# Patient Record
Sex: FEMALE | Race: WHITE | Employment: PART TIME | URBAN - METROPOLITAN AREA
[De-identification: names, ages, dates, MRNs, and addresses within clinical notes are randomized per-mention and may not be internally consistent; named-entity substitution may affect disease eponyms.]

---

## 2023-04-26 ENCOUNTER — APPOINTMENT (OUTPATIENT)
Dept: CT IMAGING | Age: 18
DRG: 101 | End: 2023-04-26
Attending: EMERGENCY MEDICINE

## 2023-04-26 ENCOUNTER — HOSPITAL ENCOUNTER (INPATIENT)
Age: 18
LOS: 1 days | Discharge: HOME | DRG: 101 | End: 2023-04-27
Attending: EMERGENCY MEDICINE | Admitting: STUDENT IN AN ORGANIZED HEALTH CARE EDUCATION/TRAINING PROGRAM

## 2023-04-26 DIAGNOSIS — R56.9 SEIZURE (HCC): Primary | ICD-10-CM

## 2023-04-26 LAB
AMPHET UR QL SCN: NEGATIVE
ANION GAP SERPL CALC-SCNC: 9 MMOL/L (ref 5–15)
APPEARANCE UR: CLEAR
BACTERIA URNS QL MICRO: NEGATIVE /HPF
BARBITURATES UR QL SCN: NEGATIVE
BASOPHILS # BLD: 0 K/UL (ref 0–0.1)
BASOPHILS NFR BLD: 0 % (ref 0–1)
BENZODIAZ UR QL: NEGATIVE
BILIRUB UR QL: NEGATIVE
BUN SERPL-MCNC: 11 MG/DL (ref 6–20)
BUN/CREAT SERPL: 12 (ref 12–20)
CALCIUM SERPL-MCNC: 9.2 MG/DL (ref 8.5–10.1)
CANNABINOIDS UR QL SCN: POSITIVE
CHLORIDE SERPL-SCNC: 104 MMOL/L (ref 97–108)
CO2 SERPL-SCNC: 27 MMOL/L (ref 21–32)
COCAINE UR QL SCN: NEGATIVE
COLOR UR: ABNORMAL
CREAT SERPL-MCNC: 0.94 MG/DL (ref 0.55–1.02)
DIFFERENTIAL METHOD BLD: ABNORMAL
DRUG SCRN COMMENT,DRGCM: ABNORMAL
EOSINOPHIL # BLD: 0 K/UL (ref 0–0.4)
EOSINOPHIL NFR BLD: 0 % (ref 0–7)
EPITH CASTS URNS QL MICRO: NORMAL /LPF
ERYTHROCYTE [DISTWIDTH] IN BLOOD BY AUTOMATED COUNT: 12.1 % (ref 11.5–14.5)
ETHANOL SERPL-MCNC: <10 MG/DL
GLUCOSE SERPL-MCNC: 92 MG/DL (ref 65–100)
GLUCOSE UR STRIP.AUTO-MCNC: NEGATIVE MG/DL
HCG UR QL: NEGATIVE
HCT VFR BLD AUTO: 43.8 % (ref 35–47)
HGB BLD-MCNC: 15 G/DL (ref 11.5–16)
HGB UR QL STRIP: NEGATIVE
IMM GRANULOCYTES # BLD AUTO: 0.2 K/UL (ref 0–0.04)
IMM GRANULOCYTES NFR BLD AUTO: 2 % (ref 0–0.5)
KETONES UR QL STRIP.AUTO: ABNORMAL MG/DL
LEUKOCYTE ESTERASE UR QL STRIP.AUTO: ABNORMAL
LYMPHOCYTES # BLD: 1.3 K/UL (ref 0.8–3.5)
LYMPHOCYTES NFR BLD: 14 % (ref 12–49)
MCH RBC QN AUTO: 29.4 PG (ref 26–34)
MCHC RBC AUTO-ENTMCNC: 34.2 G/DL (ref 30–36.5)
MCV RBC AUTO: 85.7 FL (ref 80–99)
METHADONE UR QL: NEGATIVE
MONOCYTES # BLD: 0.6 K/UL (ref 0–1)
MONOCYTES NFR BLD: 6 % (ref 5–13)
NEUTS SEG # BLD: 7.4 K/UL (ref 1.8–8)
NEUTS SEG NFR BLD: 78 % (ref 32–75)
NITRITE UR QL STRIP.AUTO: NEGATIVE
NRBC # BLD: 0 K/UL (ref 0–0.01)
NRBC BLD-RTO: 0 PER 100 WBC
OPIATES UR QL: NEGATIVE
PCP UR QL: NEGATIVE
PH UR STRIP: 6.5 (ref 5–8)
PLATELET # BLD AUTO: 279 K/UL (ref 150–400)
PMV BLD AUTO: 9.1 FL (ref 8.9–12.9)
POTASSIUM SERPL-SCNC: 4.3 MMOL/L (ref 3.5–5.1)
PROT UR STRIP-MCNC: 30 MG/DL
RBC # BLD AUTO: 5.11 M/UL (ref 3.8–5.2)
RBC #/AREA URNS HPF: NORMAL /HPF (ref 0–5)
SODIUM SERPL-SCNC: 140 MMOL/L (ref 136–145)
SP GR UR REFRACTOMETRY: 1.02
UROBILINOGEN UR QL STRIP.AUTO: 1 EU/DL (ref 0.2–1)
WBC # BLD AUTO: 9.5 K/UL (ref 3.6–11)
WBC URNS QL MICRO: NORMAL /HPF (ref 0–4)

## 2023-04-26 PROCEDURE — 96375 TX/PRO/DX INJ NEW DRUG ADDON: CPT

## 2023-04-26 PROCEDURE — 80048 BASIC METABOLIC PNL TOTAL CA: CPT

## 2023-04-26 PROCEDURE — 85025 COMPLETE CBC W/AUTO DIFF WBC: CPT

## 2023-04-26 PROCEDURE — 82077 ASSAY SPEC XCP UR&BREATH IA: CPT

## 2023-04-26 PROCEDURE — 65270000029 HC RM PRIVATE

## 2023-04-26 PROCEDURE — 96374 THER/PROPH/DIAG INJ IV PUSH: CPT

## 2023-04-26 PROCEDURE — 81001 URINALYSIS AUTO W/SCOPE: CPT

## 2023-04-26 PROCEDURE — 36415 COLL VENOUS BLD VENIPUNCTURE: CPT

## 2023-04-26 PROCEDURE — 81025 URINE PREGNANCY TEST: CPT

## 2023-04-26 PROCEDURE — 70450 CT HEAD/BRAIN W/O DYE: CPT

## 2023-04-26 PROCEDURE — 80307 DRUG TEST PRSMV CHEM ANLYZR: CPT

## 2023-04-26 PROCEDURE — 99285 EMERGENCY DEPT VISIT HI MDM: CPT

## 2023-04-26 PROCEDURE — 96376 TX/PRO/DX INJ SAME DRUG ADON: CPT

## 2023-04-26 PROCEDURE — 74011250636 HC RX REV CODE- 250/636: Performed by: EMERGENCY MEDICINE

## 2023-04-26 RX ORDER — TIZANIDINE 2 MG/1
2 TABLET ORAL DAILY
COMMUNITY

## 2023-04-26 RX ORDER — LORAZEPAM 2 MG/ML
0.5 INJECTION INTRAMUSCULAR
Status: COMPLETED | OUTPATIENT
Start: 2023-04-26 | End: 2023-04-26

## 2023-04-26 RX ORDER — LEVETIRACETAM 15 MG/ML
1500 INJECTION INTRAVASCULAR EVERY 12 HOURS
Status: DISCONTINUED | OUTPATIENT
Start: 2023-04-26 | End: 2023-04-27 | Stop reason: SDUPTHER

## 2023-04-26 RX ORDER — ONDANSETRON 2 MG/ML
4 INJECTION INTRAMUSCULAR; INTRAVENOUS
Status: COMPLETED | OUTPATIENT
Start: 2023-04-26 | End: 2023-04-26

## 2023-04-26 RX ORDER — AMITRIPTYLINE HYDROCHLORIDE 75 MG/1
75 TABLET, FILM COATED ORAL
COMMUNITY

## 2023-04-26 RX ADMIN — LORAZEPAM 0.5 MG: 2 INJECTION INTRAMUSCULAR; INTRAVENOUS at 23:16

## 2023-04-26 RX ADMIN — SODIUM CHLORIDE 1000 ML: 9 INJECTION, SOLUTION INTRAVENOUS at 21:23

## 2023-04-26 RX ADMIN — ONDANSETRON 4 MG: 2 INJECTION INTRAMUSCULAR; INTRAVENOUS at 20:16

## 2023-04-26 RX ADMIN — LORAZEPAM 0.5 MG: 2 INJECTION INTRAMUSCULAR; INTRAVENOUS at 20:16

## 2023-04-26 RX ADMIN — LEVETIRACETAM 1500 MG: 15 INJECTION, SOLUTION INTRAVENOUS at 23:16

## 2023-04-26 NOTE — ED TRIAGE NOTES
Per EMS, pt was at [employer] and had witnessed seizure. Per EMS bystanders stated pt was foaming at the mouth. Pt states they were sitting when seizure occurred. Pt denies pain, reports nausea. Pt reports feeling tired prior to seizing. Pt states they have not eaten all day and previous seizure occurred when they did not eat  Pt was taking Wellbutrin, taken off Nov 2021. Pt had seizure Nov 2021, March 2022, and today.

## 2023-04-27 ENCOUNTER — APPOINTMENT (OUTPATIENT)
Dept: MRI IMAGING | Age: 18
DRG: 101 | End: 2023-04-27
Attending: STUDENT IN AN ORGANIZED HEALTH CARE EDUCATION/TRAINING PROGRAM

## 2023-04-27 VITALS
HEIGHT: 65 IN | TEMPERATURE: 98.4 F | RESPIRATION RATE: 18 BRPM | OXYGEN SATURATION: 98 % | WEIGHT: 173.9 LBS | DIASTOLIC BLOOD PRESSURE: 64 MMHG | SYSTOLIC BLOOD PRESSURE: 103 MMHG | HEART RATE: 113 BPM | BODY MASS INDEX: 28.97 KG/M2

## 2023-04-27 LAB
ATRIAL RATE: 103 BPM
CALCULATED P AXIS, ECG09: 29 DEGREES
CALCULATED R AXIS, ECG10: 54 DEGREES
CALCULATED T AXIS, ECG11: 53 DEGREES
DIAGNOSIS, 93000: NORMAL
MAGNESIUM SERPL-MCNC: 2.3 MG/DL (ref 1.6–2.4)
P-R INTERVAL, ECG05: 128 MS
PHOSPHATE SERPL-MCNC: 4 MG/DL (ref 2.6–4.7)
Q-T INTERVAL, ECG07: 340 MS
QRS DURATION, ECG06: 92 MS
QTC CALCULATION (BEZET), ECG08: 445 MS
TSH SERPL DL<=0.05 MIU/L-ACNC: 1.11 UIU/ML (ref 0.36–3.74)
VENTRICULAR RATE, ECG03: 103 BPM

## 2023-04-27 PROCEDURE — 84100 ASSAY OF PHOSPHORUS: CPT

## 2023-04-27 PROCEDURE — 83735 ASSAY OF MAGNESIUM: CPT

## 2023-04-27 PROCEDURE — 36415 COLL VENOUS BLD VENIPUNCTURE: CPT

## 2023-04-27 PROCEDURE — 77030013256 HC HEADBAND EEG - F

## 2023-04-27 PROCEDURE — 84443 ASSAY THYROID STIM HORMONE: CPT

## 2023-04-27 PROCEDURE — 70553 MRI BRAIN STEM W/O & W/DYE: CPT

## 2023-04-27 PROCEDURE — 74011250636 HC RX REV CODE- 250/636: Performed by: STUDENT IN AN ORGANIZED HEALTH CARE EDUCATION/TRAINING PROGRAM

## 2023-04-27 PROCEDURE — 93005 ELECTROCARDIOGRAM TRACING: CPT

## 2023-04-27 PROCEDURE — A9576 INJ PROHANCE MULTIPACK: HCPCS | Performed by: STUDENT IN AN ORGANIZED HEALTH CARE EDUCATION/TRAINING PROGRAM

## 2023-04-27 PROCEDURE — 74011250637 HC RX REV CODE- 250/637: Performed by: STUDENT IN AN ORGANIZED HEALTH CARE EDUCATION/TRAINING PROGRAM

## 2023-04-27 PROCEDURE — 74011000250 HC RX REV CODE- 250: Performed by: STUDENT IN AN ORGANIZED HEALTH CARE EDUCATION/TRAINING PROGRAM

## 2023-04-27 RX ORDER — SODIUM CHLORIDE 0.9 % (FLUSH) 0.9 %
5-40 SYRINGE (ML) INJECTION EVERY 8 HOURS
Status: DISCONTINUED | OUTPATIENT
Start: 2023-04-27 | End: 2023-04-27 | Stop reason: HOSPADM

## 2023-04-27 RX ORDER — ACETAMINOPHEN 325 MG/1
650 TABLET ORAL
Status: DISCONTINUED | OUTPATIENT
Start: 2023-04-27 | End: 2023-04-27 | Stop reason: HOSPADM

## 2023-04-27 RX ORDER — SODIUM CHLORIDE 9 MG/ML
125 INJECTION, SOLUTION INTRAVENOUS CONTINUOUS
Status: DISCONTINUED | OUTPATIENT
Start: 2023-04-27 | End: 2023-04-27

## 2023-04-27 RX ORDER — SODIUM CHLORIDE 0.9 % (FLUSH) 0.9 %
5-40 SYRINGE (ML) INJECTION AS NEEDED
Status: DISCONTINUED | OUTPATIENT
Start: 2023-04-27 | End: 2023-04-27 | Stop reason: HOSPADM

## 2023-04-27 RX ORDER — LEVETIRACETAM 750 MG/1
750 TABLET ORAL 2 TIMES DAILY
Qty: 60 TABLET | Refills: 1 | Status: SHIPPED | OUTPATIENT
Start: 2023-04-27

## 2023-04-27 RX ADMIN — AMITRIPTYLINE HYDROCHLORIDE 75 MG: 50 TABLET, FILM COATED ORAL at 01:55

## 2023-04-27 RX ADMIN — SODIUM CHLORIDE 500 ML: 9 INJECTION, SOLUTION INTRAVENOUS at 16:23

## 2023-04-27 RX ADMIN — GADOTERIDOL 17 ML: 279.3 INJECTION, SOLUTION INTRAVENOUS at 11:21

## 2023-04-27 RX ADMIN — SODIUM CHLORIDE, PRESERVATIVE FREE 10 ML: 5 INJECTION INTRAVENOUS at 16:23

## 2023-04-27 RX ADMIN — SODIUM CHLORIDE 125 ML/HR: 9 INJECTION, SOLUTION INTRAVENOUS at 06:56

## 2023-04-27 RX ADMIN — LEVETIRACETAM 750 MG: 500 TABLET, FILM COATED ORAL at 16:56

## 2023-04-27 RX ADMIN — SODIUM CHLORIDE 125 ML/HR: 9 INJECTION, SOLUTION INTRAVENOUS at 02:20

## 2023-04-27 RX ADMIN — SODIUM CHLORIDE, PRESERVATIVE FREE 10 ML: 5 INJECTION INTRAVENOUS at 06:51

## 2023-04-27 RX ADMIN — SODIUM CHLORIDE, PRESERVATIVE FREE 10 ML: 5 INJECTION INTRAVENOUS at 01:57

## 2023-04-27 RX ADMIN — LEVETIRACETAM 750 MG: 500 TABLET, FILM COATED ORAL at 09:06

## 2023-04-27 NOTE — ED NOTES
Emergency Department Nursing Plan of Care       The Nursing Plan of Care is developed from the Nursing assessment and Emergency Department Attending provider initial evaluation. The plan of care may be reviewed in the ED Provider note.     The Plan of Care was developed with the following considerations:   Patient / Family readiness to learn indicated by:verbalized understanding  Persons(s) to be included in education: patient  Barriers to Learning/Limitations:No    Signed     Avis Kebede RN    4/26/2023   8:52 PM

## 2023-04-27 NOTE — PROGRESS NOTES
TRANSFER - IN REPORT:    Verbal report received from Vijay Forrest on [employer]  being received from [facility] for routine progression of care      Report consisted of patients Situation, Background, Assessment and   Recommendations(SBAR). Information from the following report(s) SBAR, Kardex, Intake/Output, MAR, and Recent Results was reviewed with the receiving nurse. Opportunity for questions and clarification was provided. Assessment completed upon patients arrival to unit and care assumed.

## 2023-04-27 NOTE — PROGRESS NOTES
Please fill out screening sheet, nurse to sign and patient. When complete call [email or web address] to schedule a time for MRI.

## 2023-04-27 NOTE — PROGRESS NOTES
0720 -- Bedside shift change report given to Carmelina Starr LPN (oncoming nurse) by Britton Dai RN (offgoing nurse). Report included the following information SBAR, Kardex, Intake/Output, and MAR.       7221 -- Perfect Serve to Dr. Rolando Barron:  New admission overnight for seizures. Do you want to order an EEG?  4/27/23 7:28 AM  No    4/27/23 7:28 AM  Just MRI and then discharge    0841 -- MRI screening questions completed, pt signed. 5595 -- IV in LT AC removed, new IV started in right forearm, 20G.     1340 -- Pt's MEWS score 3,  Sinus Tachy. Dr. Rolando Barron informed. Will continue to monitor. 1640 -- Discharge paperwork reviewed with pt and pt given her copies. Also given copy of work/school note.

## 2023-04-27 NOTE — PROGRESS NOTES
0115) Patient arrived on the unit via wheelchair with two ED staff.     2136) Admission assessment done. Patient is alert and oriented x4. VS taken. Patient is calm and  cooperative. Has new peripheral IV 20 g at left [facility]. Line assessed, flushed and recapped. Dual skin assessment done with OBDULIO Hunt. Has tattoos, facial acnes. Seizure precaution applied. Has walking boot for sprained left ankle. Patient changed her clothing to paper scrub. Call light within reach. Bed alarm activated. 0155) Scheduled Amitriptyline 75 mg tablet given with 60 ml of water. No additional needs at this time. 0220) New bag of 0.9% sodium chloride 1000 ml solution hanged and infusing. 0715) Bedside and Verbal shift change report given to OBDULIO De Leon (oncoming nurse) by Maxi Miranda (offgoing nurse). Report included the following information SBAR, Kardex, Intake/Output, MAR and Recent Results.

## 2023-04-27 NOTE — PROGRESS NOTES
Problem: Falls - Risk of  Goal: *Absence of Falls  Description: Document Vibha Salines Fall Risk and appropriate interventions in the flowsheet.   Outcome: Progressing Towards Goal  Note: Fall Risk Interventions:                                Problem: Patient Education: Go to Patient Education Activity  Goal: Patient/Family Education  Outcome: Progressing Towards Goal     Problem: Seizure Disorder (Adult)  Goal: Interventions  Outcome: Progressing Towards Goal     Problem: Patient Education: Go to Patient Education Activity  Goal: Patient/Family Education  Outcome: Progressing Towards Goal

## 2023-04-27 NOTE — DISCHARGE INSTRUCTIONS
According to state law, patient cannot drive for 6 months after a seizure. Patient is to refrain from unsupervised swimming or bathing, climbing heights, or  handling heavy machinery.   - If experience worsening of mood or depression, please call neurology for another agent   -Follow-up with neurology as an outpatient further management of seizure disorder

## 2023-04-27 NOTE — ED NOTES
TRANSFER - OUT REPORT:    Verbal report given to Souleymane Platt RN(name) on [employer]  being transferred to 210(unit) for routine progression of care       Report consisted of patients Situation, Background, Assessment and   Recommendations(SBAR). Information from the following report(s) SBAR, Kardex, ED Summary, OR Summary, Procedure Summary, Intake/Output, MAR, Recent Results and Cardiac Rhythm ST was reviewed with the receiving nurse. Lines:   Peripheral IV 04/26/23 Left Antecubital (Active)   Site Assessment Clean, dry, & intact 04/26/23 1954   Phlebitis Assessment 0 04/26/23 1954   Infiltration Assessment 0 04/26/23 1954        Opportunity for questions and clarification was provided. Patient transported with:   Monitor  Registered Nurse     Pt will be transferred after CT scan.

## 2023-04-27 NOTE — PROCEDURES
RAPID ELECTROENCEPHALOGRAM REPORT  [facility]    Procedure ID: [id number] Procedure Date: 4/27/2023   Patient Name: Raysa Chavez YOB: 2005   Procedure Type: Ceribell Rapid EEG Medical Record No: [id number]     Study Duration: 1 hour 52 minutes  Recording Start Time: 1:52 PM  Recording End Time: 3:26 PM    History: Seizure    Medications: Not listed    Description of procedure: This EEG was obtained using a 10 lead, 8 channel system positioned circumferentially without any parasagittal coverage (rapid EEG). Computer selected EEG is reviewed as well as background features and all clinically significant events. Clarity algorithm utilized and implemented to provide analysis of underlying activity and seizure detection used to facilitate reading. Description of recording: The background consists of diffuse low voltage fast frequency beta wave activity. Sleep architecture is not seen. Impression: Normal rapid EEG. No epileptiform abnormalities are seen. No evidence for ongoing or subclinical ictus. Comment: A normal EEG does not rule out the diagnosis of a seizure disorder; clinical  correlation is advised. If there is still persistent suspicion for continued seizure-like  activity, would advise obtaining an electroencephalogram with the 10-20 international  system for improved spatial resolution and parasagittal coverage.     Olvin Ayala MD

## 2023-04-27 NOTE — PROGRESS NOTES
[address] [facility].       4/27/2023      RE: [facility]      To Whom it May Concern: This is to certify that Oralia Diehl was admitted to hospital on 4/26/2023   to  She may return to work/school on 5/02/2023. Thank you for your assistance in this matter.     Sincerely,      Asad Lacy MD

## 2023-04-27 NOTE — PROGRESS NOTES
..Headband: applied  Date/Time: 4/27/23 1332  Recorder: recording started  Skin: Intact    1423:Highest Seizure [place] Percentage past hour: 0      General info regarding Seizure [place] %:  Minimum duration of study is 2 hours. If Seizure [place] has remained 0% throughout the entire 2-hour duration, communicate with provider to stop the recording. Seizure [place] 0-10% - Continue to monitor and complete 2-hour study. Seizure [place] 11-89% - Epileptiform activity present. Notify provider for next steps. Seizure [place] >/= 90% - Epileptiform activity consistent with Status Epilepticus. Immediately notify provider. *Patients with Seizure [place] above 10% that persists may require a study longer than 2 hours. Maximum recording duration is 24 hours. Please update provider with a persistent increase in Seizure [place] above 10%.

## 2023-04-27 NOTE — H&P
History & Physical    Primary Care Provider: Lou Gonzalez MD  Source of Information: Patient and chart review    History of Presenting Illness:   Lisa Abdalla is a 21 y.o. female with history of seizure disorder, anxiety, major depressive disorder, obesity who presented to [facility] for evaluation of seizures. Patient stated that had witnessed seizure while at [employer]. She admits to previous history of seizures with last seizure occurred in 2021 and did not secondary to Wellbutrin. She subsequently had seizure work-up to include EEG and noncontrast MRI which was unremarkable. She was seen in ED by teleneurology. Deep tendon; was loaded with Keppra and recommendation was for MRI brain with and without contrast prior to DC on 750 mg Keppra daily. MRI was unable to be obtained in [place] from ED at this time patient admitted for MRI in a.m. The patient denies any fever, chills, chest or abdominal pain, nausea, vomiting, cough, congestion, recent illness, palpitations, or dysuria. Remarkable vitals on ER Presentation: hr to 120  Labs Remarkable for: uds: +krystin thc  ER Images: ct head: no acute process  ER Rx: 1 L normal saline bolus, Zofran, Ativan 1 mg, Keppra 1500 mg     Review of Systems:  Pertinent items are noted in the History of Present Illness. Past Medical History:   Diagnosis Date    Anxiety     Concussion     Depression     Seizures ([facility] [address]     last seizure 4/26/23    Tachycardia       History reviewed. No pertinent surgical history. Prior to Admission medications    Medication Sig Start Date End Date Taking? Authorizing Provider   amitriptyline (ELAVIL) 75 mg tablet Take 1 Tablet by mouth nightly. Yes Allie, MD Maci   tiZANidine (ZANAFLEX) 2 mg tablet Take 1 Tablet by mouth daily. Yes Other, MD Maci     Allergies   Allergen Reactions    Peanuts [Peanut] Nausea Only      History reviewed. No pertinent family history. SOCIAL HISTORY:  Patient resides:  Independently x   Assisted Living    SNF    With family care       Smoking history:   None x   Former    Chronic      Alcohol history:   None x   Social    Chronic      Ambulates:   Independently x   w/cane    w/walker    w/wc    CODE STATUS:  DNR    Full x   Other      Objective:     Physical Exam:     Visit Vitals  /71   Pulse (!) 108   Temp 99 °F (37.2 °C)   Resp 20   Ht 5' 5\" (1.651 m)   Wt 83.9 kg (185 lb)   LMP  (LMP Unknown) Comment: nexplanon implant   SpO2 96%   BMI 30.79 kg/m²      O2 Device: None (Room air)    General:  Alert, cooperative, no distress, appears stated age. Head:  Normocephalic, without obvious abnormality, atraumatic. Eyes:  Conjunctivae/corneas clear. PERRL, EOMs intact. Nose: Nares normal. Septum midline. Mucosa normal.        Neck: Supple, symmetrical, trachea midline. Lungs:   Clear to auscultation bilaterally. Chest wall:  No tenderness or deformity. Heart:  Regular rate and rhythm, S1, S2 normal   Abdomen:   Soft, non-tender. Bowel sounds normal. No masses,  No organomegaly. Extremities: Extremities normal, atraumatic, no cyanosis or edema. Pulses: 2+ and symmetric all extremities. Skin: Skin color, texture, turgor normal. No rashes or lesions   Neurologic: CNII-XII grossl intact. EKG/Tel:  sinus tachycardia    Data Review:     Recent Days:  Recent Labs     04/26/23 2015   WBC 9.5   HGB 15.0   HCT 43.8        Recent Labs     04/26/23 2015      K 4.3      CO2 27   GLU 92   BUN 11   CREA 0.94   CA 9.2     No results for input(s): PH, PCO2, PO2, HCO3, FIO2 in the last 72 hours.     24 Hour Results:  Recent Results (from the past 24 hour(s))   URINALYSIS W/ RFLX MICROSCOPIC    Collection Time: 04/26/23  8:15 PM   Result Value Ref Range    Color YELLOW/STRAW      Appearance CLEAR CLEAR      Specific gravity 1.020      pH (UA) 6.5 5.0 - 8.0      Protein 30 (A) NEG mg/dL    Glucose Negative NEG mg/dL    Ketone TRACE (A) NEG mg/dL    Bilirubin Negative NEG      Blood Negative NEG      Urobilinogen 1.0 0.2 - 1.0 EU/dL    Nitrites Negative NEG      Leukocyte Esterase TRACE (A) NEG     DRUG SCREEN, URINE    Collection Time: 04/26/23  8:15 PM   Result Value Ref Range    AMPHETAMINES Negative NEG      BARBITURATES Negative NEG      BENZODIAZEPINES Negative NEG      COCAINE Negative NEG      METHADONE Negative NEG      OPIATES Negative NEG      PCP(PHENCYCLIDINE) Negative NEG      THC (TH-CANNABINOL) Positive (A) NEG      Drug screen comment (NOTE)    ETHYL ALCOHOL    Collection Time: 04/26/23  8:15 PM   Result Value Ref Range    ALCOHOL(ETHYL),SERUM <10 <10 MG/DL   CBC WITH AUTOMATED DIFF    Collection Time: 04/26/23  8:15 PM   Result Value Ref Range    WBC 9.5 3.6 - 11.0 K/uL    RBC 5.11 3.80 - 5.20 M/uL    HGB 15.0 11.5 - 16.0 g/dL    HCT 43.8 35.0 - 47.0 %    MCV 85.7 80.0 - 99.0 FL    MCH 29.4 26.0 - 34.0 PG    MCHC 34.2 30.0 - 36.5 g/dL    RDW 12.1 11.5 - 14.5 %    PLATELET 669 821 - 918 K/uL    MPV 9.1 8.9 - 12.9 FL    NRBC 0.0 0  WBC    ABSOLUTE NRBC 0.00 0.00 - 0.01 K/uL    NEUTROPHILS 78 (H) 32 - 75 %    LYMPHOCYTES 14 12 - 49 %    MONOCYTES 6 5 - 13 %    EOSINOPHILS 0 0 - 7 %    BASOPHILS 0 0 - 1 %    IMMATURE GRANULOCYTES 2 (H) 0.0 - 0.5 %    ABS. NEUTROPHILS 7.4 1.8 - 8.0 K/UL    ABS. LYMPHOCYTES 1.3 0.8 - 3.5 K/UL    ABS. MONOCYTES 0.6 0.0 - 1.0 K/UL    ABS. EOSINOPHILS 0.0 0.0 - 0.4 K/UL    ABS. BASOPHILS 0.0 0.0 - 0.1 K/UL    ABS. IMM.  GRANS. 0.2 (H) 0.00 - 0.04 K/UL    DF AUTOMATED     METABOLIC PANEL, BASIC    Collection Time: 04/26/23  8:15 PM   Result Value Ref Range    Sodium 140 136 - 145 mmol/L    Potassium 4.3 3.5 - 5.1 mmol/L    Chloride 104 97 - 108 mmol/L    CO2 27 21 - 32 mmol/L    Anion gap 9 5 - 15 mmol/L    Glucose 92 65 - 100 mg/dL    BUN 11 6 - 20 MG/DL    Creatinine 0.94 0.55 - 1.02 MG/DL    BUN/Creatinine ratio 12 12 - 20      eGFR >60 >60 ml/min/1.73m2    Calcium 9.2 8.5 - 10.1 MG/DL   URINE MICROSCOPIC ONLY    Collection Time: 04/26/23  8:15 PM   Result Value Ref Range    WBC 0-4 0 - 4 /hpf    RBC 0-5 0 - 5 /hpf    Epithelial cells FEW FEW /lpf    Bacteria Negative NEG /hpf   HCG URINE, QL. - POC    Collection Time: 04/26/23  8:45 PM   Result Value Ref Range    Pregnancy test,urine (POC) Negative NEG           Imaging:     Assessment:     Anita Judge is a 21 y.o. female with history of seizure disorder, anxiety, major depressive disorder, obesity who is admitted for seizure disorder.        Plan:       Seizure Disorder  -known hx of seizure disorder not on anyantiepileptic  -s/p keppra 1.5g load in ed  -Evaluated by teleneurology who recommended MRI brain with and without contrast  -MRI unobtainable in ED at this time so admission requested  -Plan for MRI brain with and without contrast, TSH, mag, Phos  -Keppra 750 p.o. twice daily  -Seizure precautions  -A.m. discharge after MRI    Major depressive disorder with generalized anxiety  -Continue home amitriptyline    THC use  -Counseled against use in setting of epilepsy  -Verbalizes understanding    Sinus tachycardia  -Chronic and stable per patient and family  -Baseline heart rate 10-previously worked up  [employer] adequate volume resuscitation  -Check electrolytes and monitor on telemetry    Obesity  -Counseled on weight loss, dieting and exercise            FEN/GI -  [email or web address]  Activity - as tolerated  DVT prophylaxis - scds  GI prophylaxis -  none  Disposition - home    CODE STATUS:   full code       Signed By: Precious Huffman MD     April 26, 2023

## 2023-04-27 NOTE — ED PROVIDER NOTES
[place] [address] [address]       Pt Name: Paula Guo  MRN: [id number]  [place] 2005  Date of evaluation: 4/26/2023  Provider: Ibis Morrison MD   PCP: Lazarus Moats, MD  Note Started: 8:08 PM 4/26/23     CHIEF COMPLAINT       Chief Complaint   Patient presents with    Seizure        HISTORY OF PRESENT ILLNESS: 1 or more elements      History From: patient, History limited by:  none     Paula Guo is a 21 y.o. female who presents via EMS following a presumed seizure. Patient remembers being in [employer] and then the next thing she knew she was waking up with medics around her asking her questions. Medics report a witnessed tonic-clonic seizure with postictal period. Patient arrives with only complaint being nausea and fatigue. Denies headache, vision changes, numbness, tingling, change in sleep habits, increase screen time, alcohol or drug use. Patient has had 2 prior seizures. The first 1 was in November 2021 and was thought to be associated with Wellbutrin, so she was taken off wellbutrin Her second seizure was in March 2022. She did follow-up with neurology after her second seizure and did have an EEG which was negative. She was not started on seizure meds at that time. Her neurologist iss Dr. Christy Nino out of [facility]. Nursing Notes were all reviewed and agreed with or any disagreements were addressed in the HPI. REVIEW OF SYSTEMS        Positives and Pertinent negatives as per HPI. PAST HISTORY     Past Medical History:  Past Medical History:   Diagnosis Date    Anxiety     Concussion     Depression     Seizures ([address]     last seizure 4/26/23    Tachycardia        Past Surgical History:  History reviewed. No pertinent surgical history. Family History:  History reviewed. No pertinent family history.     Social History:  Social History     Tobacco Use    Smoking status: Never    Smokeless tobacco: Never   Substance Use Topics    Alcohol use: Never    Drug use: Yes     Types: Marijuana     Comment: 2-3 times a week       Allergies: Allergies   Allergen Reactions    Peanuts [Peanut] Nausea Only       CURRENT MEDICATIONS      Current Discharge Medication List        CONTINUE these medications which have NOT CHANGED    Details   amitriptyline (ELAVIL) 75 mg tablet Take 1 Tablet by mouth nightly. tiZANidine (ZANAFLEX) 2 mg tablet Take 1 Tablet by mouth daily. SCREENINGS               No data recorded         PHYSICAL EXAM      ED Triage Vitals [04/26/23 1953]   ED Encounter Vitals Group      /87      Pulse (Heart Rate) (!) 120      Resp Rate 12      Temp 99 °F (37.2 °C)      Temp src       O2 Sat (%) 95 %      Weight 185 lb      Height 5' 5\"        Physical Exam  Constitutional:       General: She is not in acute distress. Appearance: She is not ill-appearing or toxic-appearing. HENT:      Nose: No congestion. Eyes:      Pupils: Pupils are equal, round, and reactive to light. Cardiovascular:      Rate and Rhythm: Tachycardia present. Pulmonary:      Effort: Pulmonary effort is normal.   Abdominal:      General: Abdomen is flat. Palpations: Abdomen is soft. Skin:     General: Skin is warm and dry. Neurological:      General: No focal deficit present. Mental Status: She is oriented to person, place, and time.         DIAGNOSTIC RESULTS   LABS:     Recent Results (from the past 12 hour(s))   URINALYSIS W/ RFLX MICROSCOPIC    Collection Time: 04/26/23  8:15 PM   Result Value Ref Range    Color YELLOW/STRAW      Appearance CLEAR CLEAR      Specific gravity 1.020      pH (UA) 6.5 5.0 - 8.0      Protein 30 (A) NEG mg/dL    Glucose Negative NEG mg/dL    Ketone TRACE (A) NEG mg/dL    Bilirubin Negative NEG      Blood Negative NEG      Urobilinogen 1.0 0.2 - 1.0 EU/dL    Nitrites Negative NEG      Leukocyte Esterase TRACE (A) NEG     DRUG SCREEN, URINE    Collection Time: 04/26/23  8:15 PM Result Value Ref Range    AMPHETAMINES Negative NEG      BARBITURATES Negative NEG      BENZODIAZEPINES Negative NEG      COCAINE Negative NEG      METHADONE Negative NEG      OPIATES Negative NEG      PCP(PHENCYCLIDINE) Negative NEG      THC (TH-CANNABINOL) Positive (A) NEG      Drug screen comment (NOTE)    ETHYL ALCOHOL    Collection Time: 04/26/23  8:15 PM   Result Value Ref Range    ALCOHOL(ETHYL),SERUM <10 <10 MG/DL   CBC WITH AUTOMATED DIFF    Collection Time: 04/26/23  8:15 PM   Result Value Ref Range    WBC 9.5 3.6 - 11.0 K/uL    RBC 5.11 3.80 - 5.20 M/uL    HGB 15.0 11.5 - 16.0 g/dL    HCT 43.8 35.0 - 47.0 %    MCV 85.7 80.0 - 99.0 FL    MCH 29.4 26.0 - 34.0 PG    MCHC 34.2 30.0 - 36.5 g/dL    RDW 12.1 11.5 - 14.5 %    PLATELET 981 106 - 153 K/uL    MPV 9.1 8.9 - 12.9 FL    NRBC 0.0 0  WBC    ABSOLUTE NRBC 0.00 0.00 - 0.01 K/uL    NEUTROPHILS 78 (H) 32 - 75 %    LYMPHOCYTES 14 12 - 49 %    MONOCYTES 6 5 - 13 %    EOSINOPHILS 0 0 - 7 %    BASOPHILS 0 0 - 1 %    IMMATURE GRANULOCYTES 2 (H) 0.0 - 0.5 %    ABS. NEUTROPHILS 7.4 1.8 - 8.0 K/UL    ABS. LYMPHOCYTES 1.3 0.8 - 3.5 K/UL    ABS. MONOCYTES 0.6 0.0 - 1.0 K/UL    ABS. EOSINOPHILS 0.0 0.0 - 0.4 K/UL    ABS. BASOPHILS 0.0 0.0 - 0.1 K/UL    ABS. IMM.  GRANS. 0.2 (H) 0.00 - 0.04 K/UL    DF AUTOMATED     METABOLIC PANEL, BASIC    Collection Time: 04/26/23  8:15 PM   Result Value Ref Range    Sodium 140 136 - 145 mmol/L    Potassium 4.3 3.5 - 5.1 mmol/L    Chloride 104 97 - 108 mmol/L    CO2 27 21 - 32 mmol/L    Anion gap 9 5 - 15 mmol/L    Glucose 92 65 - 100 mg/dL    BUN 11 6 - 20 MG/DL    Creatinine 0.94 0.55 - 1.02 MG/DL    BUN/Creatinine ratio 12 12 - 20      eGFR >60 >60 ml/min/1.73m2    Calcium 9.2 8.5 - 10.1 MG/DL   URINE MICROSCOPIC ONLY    Collection Time: 04/26/23  8:15 PM   Result Value Ref Range    WBC 0-4 0 - 4 /hpf    RBC 0-5 0 - 5 /hpf    Epithelial cells FEW FEW /lpf    Bacteria Negative NEG /hpf   HCG URINE, QL. - POC    Collection Time: 04/26/23  8:45 PM   Result Value Ref Range    Pregnancy test,urine (POC) Negative NEG     MAGNESIUM    Collection Time: 04/27/23  1:10 AM   Result Value Ref Range    Magnesium 2.3 1.6 - 2.4 mg/dL   PHOSPHORUS    Collection Time: 04/27/23  1:10 AM   Result Value Ref Range    Phosphorus 4.0 2.6 - 4.7 MG/DL   TSH 3RD GENERATION    Collection Time: 04/27/23  1:10 AM   Result Value Ref Range    TSH 1.11 0.36 - 3.74 uIU/mL        EKG: If performed, independent interpretation documented below in the MDM section     RADIOLOGY:  Non-plain film images such as CT, Ultrasound and MRI are read by the radiologist. Plain radiographic images are visualized and preliminarily interpreted by the ED Provider with the findings documented in the MDM section. Interpretation per the Radiologist below, if available at the time of this note:     CT HEAD WO CONT    Result Date: 4/27/2023  EXAM: CT HEAD WO CONT INDICATION: new onset seizure COMPARISON: None. CONTRAST: None. TECHNIQUE: Unenhanced CT of the head was performed using 5 mm images. Brain and bone windows were generated. Coronal and sagittal reformats. CT dose reduction was achieved through use of a standardized protocol tailored for this examination and automatic exposure control for dose modulation. FINDINGS: The ventricles and sulci are normal in size, shape and configuration. There is no significant white matter disease. There is no intracranial hemorrhage, extra-axial collection, or mass effect. The basilar cisterns are open. No CT evidence of acute infarct. The bone windows demonstrate no abnormalities. The visualized portions of the paranasal sinuses and mastoid air cells are clear.      Normal study       PROCEDURES   Unless otherwise noted below, none  Procedures       EMERGENCY DEPARTMENT COURSE and DIFFERENTIAL DIAGNOSIS/MDM   Vitals:    Vitals:    04/26/23 2345 04/27/23 0045 04/27/23 0128 04/27/23 0129   BP: 105/71 116/79 115/75    Pulse: (!) 108 (!) 113 (!) 113 Resp: 20 20 19    Temp:   97.8 °F (36.6 °C)    SpO2: 96% 96% 98% 98%   Weight:    78.9 kg (173 lb 14.4 oz)   Height:    5' 5\" (1.651 m)        Patient was given the following medications:  Medications   amitriptyline (ELAVIL) tablet 75 mg (75 mg Oral Given 4/27/23 0155)   sodium chloride (NS) flush 5-40 mL (10 mL IntraVENous Given 4/27/23 0157)   sodium chloride (NS) flush 5-40 mL (has no administration in time range)   0.9% sodium chloride infusion (125 mL/hr IntraVENous New Bag 4/27/23 0220)   acetaminophen (TYLENOL) tablet 650 mg (has no administration in time range)   sodium chloride 0.9 % bolus infusion 500 mL (0 mL IntraVENous Held 4/27/23 0227)   levETIRAcetam (KEPPRA) tablet 750 mg (has no administration in time range)   LORazepam (ATIVAN) injection 0.5 mg (0.5 mg IntraVENous Given 4/26/23 2016)   ondansetron (ZOFRAN) injection 4 mg (4 mg IntraVENous Given 4/26/23 2016)   sodium chloride 0.9 % bolus infusion 1,000 mL (0 mL IntraVENous IV Completed 4/26/23 2257)   LORazepam (ATIVAN) injection 0.5 mg (0.5 mg IntraVENous Given 4/26/23 2316)       Medical Decision Making  Patient presents with her third seizure which happened in [employer]. Seen neurology before but was not started on antiepileptics. She states all 3 of her seizures happen after not eating. Currently back to baseline only symptom is nausea and fatigue. Will check labs to screen for signs of infection, hypoglycemia, metabolic abnormalities. No headache or localized neurodeficits to warrant head CT. At this point patient is too far out for this to be a effect of being discontinued from Wellbutrin. Will  need to see neurology for discussion about possibly starting antiepileptics. Counseled patient that she may not drive until she follows up with neurology. Will give small dose of benzo to protect against from further seizures. Regarrding heart rate, patient does have a history of tachycardia at baseline.   We will bolus fluids while awaiting lab results. Amount and/or Complexity of Data Reviewed  Labs: ordered. Radiology: ordered. Risk  Prescription drug management. Decision regarding hospitalization. **PLEASE SEE ED COURSE DETAILS BELOW FOR FURTHER MDM DETAILS:  ED Course as of 04/27/23 0439   Wed Apr 26, 2023   2310 Prior to discharging patient, I consulted teleneurology to discuss whether or not patient to start patient on antiepileptics before d/c. He recommends loading IV Keppra now and discharging her on 750 mg Keppra every 12 hours. Recommends a total of 1 mg Ativan to calm cerebral cortex. Recommends MRI with and without contrast, which would require admission since we do not do them in the ED overnight. I conveyed this plan to the patient and she states she thinks she may have already had an MRI. Teleneurologist said he is going to see the patient and do a consult note. Will discuss with him after regarding whether or not he still wants admission given that she actually has had an MRI. [SS]   1708 Per the teleneurologist's conversation with patient and mother, the patient had an MRI about a year ago but it was done without contrast.  Still feels admission is warranted. Needs MRI with and without contrast in the morning. [SS]      ED Course User Index  [SS] Ruby Grubbs MD         FINAL IMPRESSION     1. Seizure [facility])          DISPOSITION/PLAN   Zulema Iniguez's  results have been reviewed with her. She has been counseled regarding her diagnosis, treatment, and plan. She verbally conveys understanding and agreement of the signs, symptoms, diagnosis, treatment and prognosis and additionally agrees to follow up as discussed. She also agrees with the care-plan and conveys that all of her questions have been answered.   I have also provided discharge instructions for her that include: educational information regarding their diagnosis and treatment, and list of reasons why they would want to return to the ED prior to their follow-up appointment, should her condition change. PATIENT REFERRED TO:  Follow-up Information       Follow up With Specialties Details Why Contact Info    Your neurologist        [address] [facility]    [address] [facility] [address]  [phone]    [facility] EMERGENCY DEPT Emergency Medicine  As needed, If symptoms worsen [address] [facility]              DISCHARGE MEDICATIONS:  Current Discharge Medication List            DISCONTINUED MEDICATIONS:  Current Discharge Medication List          I am the Primary Clinician of Record. Isael Mackey MD (electronically signed)    (Please note that parts of this dictation were completed with voice recognition software. Quite often unanticipated grammatical, syntax, homophones, and other interpretive errors are inadvertently transcribed by the computer software. Please disregards these errors.  Please excuse any errors that have escaped final proofreading.)
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
## 2023-04-27 NOTE — DISCHARGE SUMMARY
Discharge Summary   Please note that this dictation was completed with Offerial, the computer voice recognition software. Quite often unanticipated grammatical, syntax, homophones, and other interpretive errors are inadvertently transcribed by the computer software. Please disregard these errors. Please excuse any errors that have escaped final proofreading. PATIENT ID: Kristen Castro  MRN: [id number]   YOB: 2005    DATE OF ADMISSION: 4/26/2023  7:53 PM    DATE OF DISCHARGE: 4/27/2023  PRIMARY CARE PROVIDER: Christen Strong MD         ATTENDING PHYSICIAN: Mike Osuna MD  DISCHARGING PROVIDER: Mike Osuna MD       CONSULTATIONS: IP CONSULT TO NEUROLOGY    PROCEDURES/SURGERIES: * No surgery found *    ADMITTING HPI from excerpted H&P   [facility] Gloria is a 21 y.o. female with history of seizure disorder, anxiety, major depressive disorder, obesity who presented to [facility] ([facility]] for evaluation of seizures. Patient stated that had witnessed seizure while at [facility]. She admits to previous history of seizures with last seizure occurred in 2021 and did not secondary to Wellbutrin. She subsequently had seizure work-up to include EEG and noncontrast MRI which was unremarkable. She was seen in ED by teleneurology. Deep tendon; was loaded with Keppra and recommendation was for MRI brain with and without contrast prior to DC on 750 mg Keppra daily. MRI was unable to be obtained in [place] from ED at this time patient admitted for MRI in a.m. The patient denies any fever, chills, chest or abdominal pain, nausea, vomiting, cough, congestion, recent illness, palpitations, or dysuria.      Remarkable vitals on ER Presentation: hr to 120  Labs Remarkable for: uds: +krystin thc  ER Images: ct head: no acute process  ER Rx: 1 L normal saline bolus, Zofran, Ativan 1 mg, Keppra 1500 mg          HOSPITAL COURSE & DISCHARGE DIAGNOSIS/ PLAN:       #Secondary generalized seizure disorder  -CT head negative. MRI brain remarkable  -Assessed by neurology patient help  -EEG pending  -Patient be discharged home on Keppra 750 mg twice daily and follow-up with neurology as an outpatient further management of his disorder. According to state law, patient cannot drive for 6 months after a seizure. Patient is to refrain from unsupervised swimming or bathing, climbing heights, or  handling heavy machinery.  -Discussed with patient and her mother in detail regarding increased risk for depression and mood disorder with Keppra. They were given the option to use either Vimpat or try Keppra before switching to Vimpat. Family and patient opted for Keppra and will follow-up with their primary neurologist for the management    #Depression disorder  -Patient was on amitriptyline at home which can lower seizure threshold.   Discussed with caregiver and patient to discuss with the psychiatrist to change the medication to prevent from decreasing seizure threshold    #History of sinus tachycardia  -TSH within normal limit  -Outpatient follow-up with primary care      [facility] use  Patient was counseled extensively on the need to abstain from drugs its addictive tendencies, its deleterious effects on the brain, cardiovascular system, lungs as well as its financial & social sequelae                PENDING TEST RESULTS:   At the time of discharge the following test results are still pending: None     FOLLOW UP APPOINTMENTS:    Follow-up Information       Follow up With Specialties Details Why Contact Info    Your neurologist        [facility]    [address] [place] [address] - [place] EMERGENCY DEPT Emergency Medicine  As needed, If symptoms worsen [address], [address], MD [address]  [phone]               ADDITIONAL CARE RECOMMENDATIONS: Follow up neurolgy     DIET: Regular Diet    ACTIVITY: Activity as tolerated    WOUND CARE: None     EQUIPMENT needed: None       DISCHARGE MEDICATIONS:  Current Discharge Medication List        START taking these medications    Details   levETIRAcetam (KEPPRA) 750 mg tablet Take 1 Tablet by mouth two (2) times a day. Qty: 60 Tablet, Refills: 1  Start date: 4/27/2023           CONTINUE these medications which have NOT CHANGED    Details   amitriptyline (ELAVIL) 75 mg tablet Take 1 Tablet by mouth nightly. tiZANidine (ZANAFLEX) 2 mg tablet Take 1 Tablet by mouth daily. NOTIFY YOUR PHYSICIAN FOR ANY OF THE FOLLOWING:   Fever over 101 degrees for 24 hours. Chest pain, shortness of breath, fever, chills, nausea, vomiting, diarrhea, change in mentation, falling, weakness, bleeding. Severe pain or pain not relieved by medications. Or, any other signs or symptoms that you may have questions about.     DISPOSITION:    Home With:   OT  PT  HH  RN       Long term SNF/Inpatient Rehab   x Independent/assisted living    Hospice    Other:       PATIENT CONDITION AT DISCHARGE:     Functional status    Poor     Deconditioned    x Independent      Cognition    x Lucid     Forgetful     Dementia      Catheters/lines (plus indication)    [place]     PICC     PEG    x None      Code status    x Full code     DNR      PHYSICAL EXAMINATION AT DISCHARGE:    General : alert x 3, awake, no acute distress,   HEENT: PEERL, EOMI, moist mucus membrane, TM clear  Neck: supple, no JVD, no meningeal signs  Chest: Clear to auscultation bilaterally   CVS: S1 S2 heard, Capillary refill less than 2 seconds  Abd: soft/ Non tender, non distended, BS physiological,   Ext: no clubbing, no cyanosis, no edema, brisk 2+ DP pulses  Neuro/Psych: pleasant mood and affect, CN 2-12 grossly intact, sensory grossly within normal limit, Strength 5/5 in all extremities, DTR 1+ x 4  Skin: warm     CHRONIC MEDICAL DIAGNOSES:  Problem List as of 4/27/2023 Never Reviewed            Codes Class Noted - Resolved    Seizure [facility]) ICD-10-CM: R56.9  ICD-9-CM: 780.39  4/26/2023 - Present           Greater than 31 minutes were spent with the patient on counseling and coordination of care    Signed:   Rafy Lyles MD  4/27/2023  12:06 PM

## 2023-04-27 NOTE — PROGRESS NOTES
Patients case reviewed during interdisciplinary team meeting in Med Surg/Tele Unit 2.  Rev.  Ced Romano 74, [address]

## 2023-04-27 NOTE — PROGRESS NOTES
RENATA:    RUR: 6%  ELOS: 4/27/2023  DISPOSITION: Home   TRANSPORTATION: Mother    FOLLOW UP: PCP and Neurology  PHARMACY: [facility]  DME: Ankle boot @ home    IDR: CM discussed pt's discharge plan with MD, nurse, and pharmacist in [facility] at 10:00 AM. Pt in need of PCP and neurology follow up. Pt to be discharged today. Reason for Admission: Per EMS, pt was at [employer] and had witnessed seizure. Per EMS bystanders stated pt was foaming at the mouth. Pt states they were sitting when seizure occurred. Pt denies pain, reports nausea. Pt reports feeling tired prior to seizing. Pt states they have not eaten all day and previous seizure occurred when they did not eat  Pt was taking Wellbutrin, taken off Nov 2021. Pt had seizure Nov 2021, March 2022, and today. RUR Score:  6%                   Plan for utilizing home health:  None       PCP: First and Last name:  Hunter Piedra MD     Name of Practice:    Are you a current patient: Yes/No:    Approximate date of last visit:    Can you participate in a virtual visit with your PCP:                     Current Advanced Directive/Advance Care Plan: Full Code    Healthcare Decision Maker:   Click here to complete [address] including selection of the Healthcare Decision Maker Relationship (ie \"Primary\")                        Transition of Care Plan:      CM reviewed chart. CM completed assessment with pt at bedside. Pt is alert and oriented throughout encounter. CM introduced self/role, verified demographics, and discussed discharge planning. CM met with pt and pt's mother, Madonna Barbour, in room to complete initial assessment. Pt confirmed contact information, emergency contact, insurance, pharmacy, transportation, living situation, ability to perform ADLs, DME, and other providers in the community. Pt is currently in college, but lives with her parents when she is not in school. She is able to complete all ADLs independently. Pt is currently wearing an ankle boot at home due to a sprained ankle, but does not use any other DME at this time. Liya Salmeron, pt's mother, expressed some concern regarding pt's recent change in medication. Ramoneolivia Triana has spoken to MD regarding her concerns. CATA sent a message via PrintToPeer to Dr. Meche Blackman to inform him that these concerns were expressed. Ramone Triana requested CM's assistance in scheduling a neurology appt for pt. CM discussed provider options with mother, who would like pt to be scheduled with the soonest available provider. CM to call [facility]. Ramoneolivia Triana provided [employer] information, [employer]Forward (PPO). Member ID: [id number]. CATA made copies of card to be placed in chart. CATA called the office of Dr. Pipo Ma at [phone] to schedule hospital follow up appt for pt. Ramone Triana said that pt will need to see the PCP tomorrow or Monday morning before returning to school. CM left a VM requesting a call back. CATA called [facility] to schedule a new patient appt for pt. CATA spoke with Demi Marinelli, who said that they are not accepting any new patient appts at this time. Demi Marinelli added pt to their wait list for new patients. [facility] will reach out to pt and pt's mother to schedule appt. Pt would also like assistance finding a psychiatrist. CM to provide with resources. 2:43 pm: CM provided pt with list of psychiatrists/mental health resources. CM scheduled pt's hospital follow up appt with Dr. Pipo Ma for 5/1 at 9:45 AM. CATA called [address] [place] [address] Neurology and scheduled pt a neurology appt for 9/1 at 10:00 am [facility] location). Care Management Interventions  PCP Verified by CM: Yes  Mode of Transport at Discharge:  Other (see comment) (Mother)  Transition of Care Consult (CM Consult): Discharge Planning  Discharge Durable Medical Equipment: No  Health Maintenance Reviewed: Yes  Physical Therapy Consult: No  Occupational Therapy Consult: No  Speech Therapy Consult: No  Support Systems: Parent(s)  Confirm Follow Up Transport: Family  The Plan for Transition of Care is Related to the Following Treatment Goals : PCP, Neurology appt, psychiatrist resources, discharge planning  The Patient and/or Patient Representative was Provided with a Choice of Provider and Agrees with the Discharge Plan?: Yes  Freedom of Choice List was Provided with Basic Dialogue that Supports the Patient's Individualized Plan of Care/Goals, Treatment Preferences and Shares the Quality Data Associated with the Providers?: Yes  Discharge Location  Patient Expects to be Discharged to[de-identified] Home with outpatient services     CARLIE Seth, Care Manager  [phone]

## 2023-04-29 ENCOUNTER — TELEPHONE (OUTPATIENT)
Dept: CASE MANAGEMENT | Age: 18
End: 2023-04-29

## 2023-04-29 NOTE — TELEPHONE ENCOUNTER
CM called patient by telephone to perform post discharge assessment and for the purpose of follow up call from inpatient discharge to check on environmental challenges/medications/appointment follow up/and questions/concerns. CM left VM for patient to return call. CM will attempt a 2nd call.     [address] [facility] ([place], [place]), [address] [phone]

## 2023-07-21 ENCOUNTER — TELEPHONE (OUTPATIENT)
Age: 18
End: 2023-07-21

## 2023-07-21 NOTE — TELEPHONE ENCOUNTER
Called patient to reschedule appt from 9/1/2023 at 10am to a sooner appt which is 8/23/2023 at 11am and to arrive by 10:30am at [address] [facility] Angie José but unable to [facility] due to voicemail being full.